# Patient Record
Sex: MALE | Race: WHITE
[De-identification: names, ages, dates, MRNs, and addresses within clinical notes are randomized per-mention and may not be internally consistent; named-entity substitution may affect disease eponyms.]

---

## 2020-10-31 ENCOUNTER — HOSPITAL ENCOUNTER (EMERGENCY)
Dept: HOSPITAL 75 - ER | Age: 40
End: 2020-10-31
Payer: COMMERCIAL

## 2020-10-31 VITALS — WEIGHT: 240.3 LBS | BODY MASS INDEX: 33.64 KG/M2 | HEIGHT: 70.87 IN

## 2020-10-31 VITALS — DIASTOLIC BLOOD PRESSURE: 93 MMHG | SYSTOLIC BLOOD PRESSURE: 149 MMHG

## 2020-10-31 DIAGNOSIS — M24.9: ICD-10-CM
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
## 2020-10-31 DIAGNOSIS — S46.811A: Primary | ICD-10-CM
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
## 2020-10-31 LAB
BASOPHILS # BLD AUTO: 0 10^3/UL (ref 0–0.1)
BASOPHILS NFR BLD AUTO: 0 % (ref 0–10)
EOSINOPHIL # BLD AUTO: 0.1 10^3/UL (ref 0–0.3)
EOSINOPHIL NFR BLD AUTO: 1 % (ref 0–10)
HCT VFR BLD CALC: 45 % (ref 40–54)
HGB BLD-MCNC: 15 G/DL (ref 13.3–17.7)
LYMPHOCYTES # BLD AUTO: 3.3 10^3/UL (ref 1–4)
LYMPHOCYTES NFR BLD AUTO: 29 % (ref 12–44)
MANUAL DIFFERENTIAL PERFORMED BLD QL: NO
MCH RBC QN AUTO: 31 PG (ref 25–34)
MCHC RBC AUTO-ENTMCNC: 34 G/DL (ref 32–36)
MCV RBC AUTO: 93 FL (ref 80–99)
MONOCYTES # BLD AUTO: 0.6 10^3/UL (ref 0–1)
MONOCYTES NFR BLD AUTO: 5 % (ref 0–12)
NEUTROPHILS # BLD AUTO: 7.1 10^3/UL (ref 1.8–7.8)
NEUTROPHILS NFR BLD AUTO: 64 % (ref 42–75)
PLATELET # BLD: 350 10^3/UL (ref 130–400)
PMV BLD AUTO: 11.6 FL (ref 9–12.2)
WBC # BLD AUTO: 11.2 10^3/UL (ref 4.3–11)

## 2020-10-31 PROCEDURE — 73030 X-RAY EXAM OF SHOULDER: CPT

## 2020-10-31 PROCEDURE — 36415 COLL VENOUS BLD VENIPUNCTURE: CPT

## 2020-10-31 PROCEDURE — 85025 COMPLETE CBC W/AUTO DIFF WBC: CPT

## 2020-10-31 PROCEDURE — 71046 X-RAY EXAM CHEST 2 VIEWS: CPT

## 2020-10-31 PROCEDURE — 74177 CT ABD & PELVIS W/CONTRAST: CPT

## 2020-10-31 NOTE — DIAGNOSTIC IMAGING REPORT
INDICATION: Fall.



EXAMINATION: PA and lateral views of the chest were obtained.



FINDINGS: The heart size, mediastinal configuration, and

pulmonary vascularity are within normal limits. There is no

pleural effusion, pneumothorax, or pneumonia. The osseous

structures are unremarkable. 



IMPRESSION: No acute cardiopulmonary abnormality.



Dictated by: 



  Dictated on workstation # [id number]

## 2020-10-31 NOTE — ED GENERAL
General


Stated Complaint:  FALL


Source of Information:  Patient


Exam Limitations:  No Limitations





History of Present Illness


Date Seen by Provider:  Oct 31, 2020


Time Seen by Provider:  17:14


Initial Comments


To ER with reports of a fall. He was up on the top rung of a ladder working on 

his soffit on his house. The ladder tipped and he fell landing on his right 

side. He complains of pain with limited range of motion to the right shoulder. 

Did not hit his head and denies neck pain. He does have some posterior and 

lateral left flank pain. He denies any numbness or tingling of extremities. He 

recalls all events.


Timing/Duration:  1/2 Hour


Severity:  Moderate


Associated Systoms:  Denies Symptoms; No Cough, No Headaches, No Seizure, No 

Shortness of Air, No Syncope, No Weakness





Allergies and Home Medications


Allergies


Coded Allergies:  


     No Known Drug Allergies (Unverified , 2/15/11)





Home Medications


No Active Prescriptions or Reported Meds





Patient Home Medication List


Home Medication List Reviewed:  Yes





Review of Systems


Review of Systems


Constitutional:  see HPI


EENTM:  see HPI


Respiratory:  no symptoms reported


Cardiovascular:  no symptoms reported


Musculoskeletal:  see HPI, joint pain, muscle pain


Skin:  no symptoms reported


Psychiatric/Neurological:  No Symptoms Reported


Hematologic/Lymphatic:  No Symptoms Reported





Past Medical-Social-Family Hx


Patient Social History


Recent Foreign Travel:  No


Contact w/Someone Who Travel:  No





Past Medical History


Reproductive Disorders:  No





Physical Exam


Vital Signs





Vital Signs - First Documented








 10/31/20





 17:00


 


Temp 36.2


 


Pulse 69


 


Resp 18


 


B/P (MAP) 149/93 (111)


 


Pulse Ox 97





Capillary Refill :


Height, Weight, BMI


Height: '"


Weight: lbs. oz. kg;  BMI


Method:


General Appearance:  No Apparent Distress, WD/WN, Other (alert and oriented, 

walks into room 6 without antalgic gait, alert and oriented GCS 15. Offered pain

medication but states he doesn't hurt that bad.)


Eyes:  Bilateral Eye Normal Inspection, Bilateral Eye PERRL, Bilateral Eye Other

(no Scruggs sign or hemotympanum.)


HEENT:  PERRL/EOMI, TMs Normal


Neck:  Full Range of Motion, Normal Inspection, Non Tender; No Tender Lateral, 

No Tender Midline


Respiratory:  No Accessory Muscle Use, No Respiratory Distress


Cardiovascular:  Regular Rate, Rhythm, Normal Peripheral Pulses


Gastrointestinal:  Normal Bowel Sounds, Soft, Tenderness (minimal left lateral 

and posterior flank tenderness but no bruising or ecchymosis or abrasion)


Extremity:  Normal Capillary Refill, Normal Inspection


Neurologic/Psychiatric:  Alert, Oriented x3


Skin:  Normal Color, Warm/Dry





Progress/Results/Core Measures


Suspected Sepsis


SIRS


Temperature: 


Pulse:  


Respiratory Rate: 


 


Laboratory Tests


10/31/20 17:15: White Blood Count 11.2H


Blood Pressure  / 


Mean: 


 











Laboratory Tests


10/31/20 17:15: Platelet Count 350





Results/Orders


Lab Results





Laboratory Tests








Test


 10/31/20


17:15 Range/Units


 


 


White Blood Count


 11.2 H


 4.3-11.0


10^3/uL


 


Red Blood Count


 4.81 


 4.30-5.52


10^6/uL


 


Hemoglobin 15.0  13.3-17.7  g/dL


 


Hematocrit 45  40-54  %


 


Mean Corpuscular Volume 93  80-99  fL


 


Mean Corpuscular Hemoglobin 31  25-34  pg


 


Mean Corpuscular Hemoglobin


Concent 34 


 32-36  g/dL





 


Red Cell Distribution Width 12.7  10.0-14.5  %


 


Platelet Count


 350 


 130-400


10^3/uL


 


Mean Platelet Volume 11.6  9.0-12.2  fL


 


Immature Granulocyte % (Auto) 1   %


 


Neutrophils (%) (Auto) 64  42-75  %


 


Lymphocytes (%) (Auto) 29  12-44  %


 


Monocytes (%) (Auto) 5  0-12  %


 


Eosinophils (%) (Auto) 1  0-10  %


 


Basophils (%) (Auto) 0  0-10  %


 


Neutrophils # (Auto)


 7.1 


 1.8-7.8


10^3/uL


 


Lymphocytes # (Auto)


 3.3 


 1.0-4.0


10^3/uL


 


Monocytes # (Auto)


 0.6 


 0.0-1.0


10^3/uL


 


Eosinophils # (Auto)


 0.1 


 0.0-0.3


10^3/uL


 


Basophils # (Auto)


 0.0 


 0.0-0.1


10^3/uL


 


Immature Granulocyte # (Auto)


 0.1 


 0.0-0.1


10^3/uL








My Orders





Orders - CHEMA RODRIGUEZ


Cbc With Automated Diff (10/31/20 17:08)


Ed Iv/Invasive Line Start (10/31/20 17:08)


Ct Abdomen/Pelvis W (10/31/20 17:08)


Shoulder, Right, 3 Views (10/31/20 17:08)


Chest Pa/Lat (2 View) (10/31/20 17:20)


Iohexol Injection (Omnipaque 350 Mg/Ml 1 (10/31/20 18:00)


Received Contrast (Hold Metformin- Contr (10/31/20 18:00)


Ns (Ivpb) (Sodium Chloride 0.9% Ivpb Bag (10/31/20 18:00)





Medications Given in ED





Current Medications








 Medications  Dose


 Ordered  Sig/Sandie


 Route  Start Time


 Stop Time Status Last Admin


Dose Admin


 


 Iohexol  100 ml  ONCE  ONCE


 IV  10/31/20 18:00


 10/31/20 18:01 DC 10/31/20 17:56


100 ML


 


 Sodium Chloride  100 ml  ONCE  ONCE


 IV  10/31/20 18:00


 10/31/20 18:01 DC 10/31/20 17:56


80 ML








Vital Signs/I&O











 10/31/20





 17:00


 


Temp 36.2


 


Pulse 69


 


Resp 18


 


B/P (MAP) 149/93 (111)


 


Pulse Ox 97





Capillary Refill :





Departure


Impression





   Primary Impression:  


   Fall


   Additional Impressions:  


   Internal derangement of right shoulder


   Muscle strain


Disposition:  01 HOME, SELF-CARE


Condition:  Stable





Departure-Patient Inst.


Decision time for Depature:  18:04


Referrals:  


JHON LOVE MD (PCP)


Primary Care Physician


Patient Instructions:  Muscle Strain (DC), Shoulder Pain (DC)





Add. Discharge Instructions:  


1. Follow-up with your doctor next week for recheck


2. Wear the sling as needed for comfort. Take pain medication as directed. 

Return to ER for any worsening.


Scripts


No Active Prescriptions or Reported Meds











CHEMA RODRIGUEZ             Oct 31, 2020 17:18

## 2020-10-31 NOTE — DIAGNOSTIC IMAGING REPORT
INDICATION: Fall.



EXAMINATION: Three views of the right shoulder were obtained.



FINDINGS: The alignment is normal. There is no fracture or

dislocation. Right lung is clear. Soft tissues are unremarkable.



IMPRESSION: No focal abnormality in the right shoulder. 



Dictated by: 



  Dictated on workstation # [id number]

## 2020-10-31 NOTE — DIAGNOSTIC IMAGING REPORT
PROCEDURE: CT abdomen and pelvis with contrast.



TECHNIQUE: Multiple contiguous axial images were obtained through

the abdomen and pelvis after administration of intravenous

contrast. Auto Exposure Controls were utilized during the CT exam

to meet ALARA standards for radiation dose reduction. All CT

scans use one or more of the following dose optimizing

techniques: automated exposure control, MA and/or KvP adjustment

based on patient size and exam type or iterative reconstruction.



INDICATION: Fall with pain.



FINDINGS: The heart size is normal. The lung bases are clear. The

liver is normal in size and without focal lesions. Gallbladder is

unremarkable. There is no biliary ductal dilatation. Spleen is

normal. The pancreas and adrenal glands are unremarkable. Kidneys

are unremarkable. The aorta is nonaneurysmal. Bowel gas pattern

is nonspecific. There is no free air. There is no ascites. There

is no pelvic mass, adenopathy or free fluid. The osseous

structures are unremarkable.



IMPRESSION: No acute abnormality in the abdomen or pelvis.



Dictated by: 



  Dictated on workstation # [id number]

## 2020-10-31 NOTE — NUR
-------------------------------------------------------------------------------

           *** Note undone in EDM - 10/31/20 at 1823 by DARIO ***            

-------------------------------------------------------------------------------

Pt resting on ED cart. SPO2 96% via RA. HR 76. /65

## 2022-03-02 ENCOUNTER — HOSPITAL ENCOUNTER (OUTPATIENT)
Dept: HOSPITAL 75 - SLEEP | Age: 42
End: 2022-03-02
Attending: NURSE PRACTITIONER
Payer: COMMERCIAL

## 2022-03-02 DIAGNOSIS — G47.10: Primary | ICD-10-CM
